# Patient Record
Sex: FEMALE | Race: ASIAN | URBAN - METROPOLITAN AREA
[De-identification: names, ages, dates, MRNs, and addresses within clinical notes are randomized per-mention and may not be internally consistent; named-entity substitution may affect disease eponyms.]

---

## 2019-06-03 ENCOUNTER — OFFICE VISIT (OUTPATIENT)
Dept: INTERNAL MEDICINE CLINIC | Age: 69
End: 2019-06-03
Payer: COMMERCIAL

## 2019-06-03 VITALS
HEIGHT: 59 IN | BODY MASS INDEX: 27.21 KG/M2 | DIASTOLIC BLOOD PRESSURE: 74 MMHG | OXYGEN SATURATION: 99 % | SYSTOLIC BLOOD PRESSURE: 136 MMHG | HEART RATE: 84 BPM | WEIGHT: 135 LBS

## 2019-06-03 DIAGNOSIS — R73.9 HYPERGLYCEMIA: Chronic | ICD-10-CM

## 2019-06-03 DIAGNOSIS — R42 VERTIGO: Primary | ICD-10-CM

## 2019-06-03 DIAGNOSIS — I10 ESSENTIAL HYPERTENSION: Chronic | ICD-10-CM

## 2019-06-03 PROCEDURE — 99202 OFFICE O/P NEW SF 15 MIN: CPT | Performed by: INTERNAL MEDICINE

## 2019-06-03 RX ORDER — MECLIZINE HYDROCHLORIDE 25 MG/1
TABLET ORAL
Refills: 0 | COMMUNITY
Start: 2019-05-31

## 2019-06-03 RX ORDER — PROMETHAZINE HYDROCHLORIDE 25 MG/1
25 TABLET ORAL 3 TIMES DAILY PRN
Qty: 20 TABLET | Refills: 0 | Status: SHIPPED | OUTPATIENT
Start: 2019-06-03 | End: 2019-06-10

## 2019-06-03 RX ORDER — TRETINOIN 0.5 MG/G
CREAM TOPICAL
Refills: 0 | COMMUNITY
Start: 2019-05-29

## 2019-06-03 RX ORDER — MECLIZINE HYDROCHLORIDE 25 MG/1
25 TABLET ORAL 3 TIMES DAILY PRN
Qty: 45 TABLET | Refills: 2 | Status: SHIPPED | OUTPATIENT
Start: 2019-06-03 | End: 2020-06-02

## 2019-06-03 RX ORDER — METHYLPREDNISOLONE 4 MG/1
TABLET ORAL
Qty: 1 KIT | Refills: 0 | Status: SHIPPED | OUTPATIENT
Start: 2019-06-03

## 2019-06-03 RX ORDER — GLIMEPIRIDE 1 MG/1
1 TABLET ORAL
COMMUNITY

## 2019-06-03 SDOH — HEALTH STABILITY: MENTAL HEALTH: HOW OFTEN DO YOU HAVE A DRINK CONTAINING ALCOHOL?: NEVER

## 2019-06-03 ASSESSMENT — PATIENT HEALTH QUESTIONNAIRE - PHQ9
SUM OF ALL RESPONSES TO PHQ QUESTIONS 1-9: 0
1. LITTLE INTEREST OR PLEASURE IN DOING THINGS: 0
SUM OF ALL RESPONSES TO PHQ9 QUESTIONS 1 & 2: 0
2. FEELING DOWN, DEPRESSED OR HOPELESS: 0
SUM OF ALL RESPONSES TO PHQ QUESTIONS 1-9: 0

## 2019-06-03 NOTE — PROGRESS NOTES
PHQ Scores 6/3/2019   PHQ2 Score 0   PHQ9 Score 0     Interpretation of Total Score Depression Severity: 1-4 = Minimal depression, 5-9 = Mild depression, 10-14 = Moderate depression, 15-19 = Moderately severe depression, 20-27 = Severe depression

## 2019-06-03 NOTE — PROGRESS NOTES
Subjective   Patient complains of dizziness on and off since several days. Rich Schilling Describes it more as vertigo. Gets spinning sensation and a sensation like the head is in motion. Happens in bouts, on and off. Worse on movements of the head. Better when motionless. Accompanied by some nausea and emesis. Feels very disabled and weak during the bout. No chest pain or shortness of breath. no palpitations or drop in blood pressure. No ear pain. No sinus problems. No new medications. No blurred vision or headaches or any siezures. Has not tried any medications. Past Medical History:   Diagnosis Date    Essential hypertension 6/3/2019    Hyperglycemia 6/3/2019    Hypertension        History reviewed. No pertinent surgical history. Current Outpatient Medications   Medication Sig Dispense Refill    meclizine (ANTIVERT) 25 MG tablet TK 1 T PO TID PRN  0    tretinoin (RETIN-A) 0.05 % cream APPLY A PEA SIZED AMOUNT TO FOREHEAD EVERY EVENING  0    triamcinolone (KENALOG) 0.1 % ointment SARAH THIN LAYER EXT AA TID FOR 7 DAYS  1    glimepiride (AMARYL) 1 MG tablet Take 1 mg by mouth every morning (before breakfast)      NEBIVOLOL HCL PO Take by mouth       No current facility-administered medications for this visit.         No Known Allergies    Social History     Socioeconomic History    Marital status:      Spouse name: Not on file    Number of children: Not on file    Years of education: Not on file    Highest education level: Not on file   Occupational History    Not on file   Social Needs    Financial resource strain: Not on file    Food insecurity:     Worry: Not on file     Inability: Not on file    Transportation needs:     Medical: Not on file     Non-medical: Not on file   Tobacco Use    Smoking status: Never Smoker    Smokeless tobacco: Never Used   Substance and Sexual Activity    Alcohol use: Never     Frequency: Never    Drug use: Never    Sexual activity: Not Currently   Lifestyle    Physical and advised patient to continue the current instructions or medications. Hyperglycemia  Taking amaryl,  Patient is compliant w medications, no side effects, effective, provides adequate symptom relief. No new symptoms or problems as noted by patient. The problem is stable, no changes noted by patient. Will consider monitoring labs and refill medications as appropriate. Patient counseled and will continue current plan. Will also try steroids, benadryl and meclizine. This is consistent with benign positional vertigo aka peripheral vestibulopathy. Explained the patient and reassured. Counseled the patient regarding vestibular exercises and hand-out given. A script for meclizine was given and steroids will be considered. Patient instructed to call back if these vertiginous bouts have not resolved in 1-2 weeks.

## 2019-06-13 ENCOUNTER — TELEPHONE (OUTPATIENT)
Dept: INTERNAL MEDICINE CLINIC | Age: 69
End: 2019-06-13

## 2019-06-13 DIAGNOSIS — R42 VERTIGO: Primary | ICD-10-CM

## 2019-06-13 RX ORDER — PROMETHAZINE HYDROCHLORIDE 25 MG/1
25 TABLET ORAL 3 TIMES DAILY PRN
Qty: 12 TABLET | Refills: 0 | Status: SHIPPED | OUTPATIENT
Start: 2019-06-13 | End: 2019-06-19 | Stop reason: SDUPTHER

## 2019-06-13 NOTE — TELEPHONE ENCOUNTER
Was seen on 6-3-19 for vertigo was given   Meclizine its not helping very much wants to know what else to do Suyapa Ladd  [phone]

## 2019-06-18 NOTE — TELEPHONE ENCOUNTER
promethazine (PHENERGAN) 25 MG tablet [[id number]]    methylPREDNISolone (MEDROL DOSEPACK) 4 MG tablet [[id number]    Pt used the two med Dr. Tina Cabrera prescribed for her in her last office visit, but still feel the dizziness. Pt would like to know what Dr. Tina Cabrera would advice.    Phone # [phone]

## 2019-06-18 NOTE — TELEPHONE ENCOUNTER
I would like her to stay on meclizine 25 mg orally tid prn for a few more weeks. We can call in a refill if she wishes.

## 2019-06-19 RX ORDER — PROMETHAZINE HYDROCHLORIDE 25 MG/1
25 TABLET ORAL 3 TIMES DAILY PRN
Qty: 12 TABLET | Refills: 0 | Status: SHIPPED | OUTPATIENT
Start: 2019-06-19 | End: 2019-06-26

## 2019-06-19 NOTE — TELEPHONE ENCOUNTER
Medication:   Requested Prescriptions     Pending Prescriptions Disp Refills    promethazine (PHENERGAN) 25 MG tablet 12 tablet 0     Sig: Take 1 tablet by mouth 3 times daily as needed (nausea or dizziness)        Last Filled:  9/13/2019    Patient Phone Number: [phone] (home)     Last appt: 6/3/2019   Next appt: None schedule    Last OARRS: No flowsheet data found.     Preferred Pharmacy:   [facility] [phone] - F [phone]  [address] [place] [address]  Phone: [phone] Fax: [phone]